# Patient Record
Sex: MALE | URBAN - METROPOLITAN AREA
[De-identification: names, ages, dates, MRNs, and addresses within clinical notes are randomized per-mention and may not be internally consistent; named-entity substitution may affect disease eponyms.]

---

## 2022-10-05 ENCOUNTER — OFFICE VISIT (OUTPATIENT)
Dept: URBAN - METROPOLITAN AREA CLINIC 126 | Facility: CLINIC | Age: 42
End: 2022-10-05

## 2022-12-07 ENCOUNTER — DASHBOARD ENCOUNTERS (OUTPATIENT)
Age: 42
End: 2022-12-07

## 2022-12-07 ENCOUNTER — OFFICE VISIT (OUTPATIENT)
Dept: URBAN - METROPOLITAN AREA CLINIC 126 | Facility: CLINIC | Age: 42
End: 2022-12-07
Payer: COMMERCIAL

## 2022-12-07 VITALS
TEMPERATURE: 97.5 F | SYSTOLIC BLOOD PRESSURE: 120 MMHG | HEIGHT: 70 IN | DIASTOLIC BLOOD PRESSURE: 80 MMHG | HEART RATE: 80 BPM | BODY MASS INDEX: 23.54 KG/M2 | WEIGHT: 164.4 LBS

## 2022-12-07 DIAGNOSIS — K62.5 RECTAL BLEEDING: ICD-10-CM

## 2022-12-07 PROCEDURE — 99202 OFFICE O/P NEW SF 15 MIN: CPT | Performed by: NURSE PRACTITIONER

## 2022-12-07 NOTE — PHYSICAL EXAM CHEST:
no lesions,  no deformities,  no traumatic injuries,  no significant scars are present,  chest wall non-tender,  no masses present, breathing is unlabored without accessory muscle use,normal breath sounds
normal...

## 2022-12-07 NOTE — HPI-TODAY'S VISIT:
This patient was referred by Dr. Julius Huston  for an evaluation of possible hemorrhoids.  A copy of this will be sent to the referring provider.  Very pleasant 42-year-old male seen today for evaluation of possible hemorrhoids.  Patient reported diarrhea in March that lasted 3 weeks.  At that time he did have 1 drop of blood on toilet paper.  He changed his diet and the diarrhea resolved and he has had no further bleeding.  Patient denies any unexplained weight loss.  Denies abdominal pain.  Denies unexplained weight loss.  There is no change in bowel habits.  No family history of colon polyps or colon cancer.  His appetite is good and energy level is good.